# Patient Record
Sex: MALE | Race: WHITE | NOT HISPANIC OR LATINO
[De-identification: names, ages, dates, MRNs, and addresses within clinical notes are randomized per-mention and may not be internally consistent; named-entity substitution may affect disease eponyms.]

---
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
## 2024-11-07 ENCOUNTER — HOSPITAL ENCOUNTER (OUTPATIENT)
Age: 19
Discharge: HOME | End: 2024-11-07
Payer: COMMERCIAL

## 2024-11-07 ENCOUNTER — APPOINTMENT (OUTPATIENT)
Dept: GENERAL RADIOLOGY | Age: 19
End: 2024-11-07
Attending: PHYSICIAN ASSISTANT
Payer: COMMERCIAL

## 2024-11-07 VITALS
OXYGEN SATURATION: 99 % | RESPIRATION RATE: 16 BRPM | SYSTOLIC BLOOD PRESSURE: 124 MMHG | TEMPERATURE: 99 F | HEART RATE: 79 BPM | DIASTOLIC BLOOD PRESSURE: 74 MMHG

## 2024-11-07 DIAGNOSIS — M77.8 RIGHT WRIST TENDONITIS: Primary | ICD-10-CM

## 2024-11-07 PROCEDURE — L3924 HFO WITHOUT JOINTS PRE OTS: HCPCS | Performed by: PHYSICIAN ASSISTANT

## 2024-11-07 PROCEDURE — 73110 X-RAY EXAM OF WRIST: CPT | Performed by: PHYSICIAN ASSISTANT

## 2024-11-07 PROCEDURE — 99213 OFFICE O/P EST LOW 20 MIN: CPT | Performed by: PHYSICIAN ASSISTANT

## 2024-11-07 RX ORDER — IBUPROFEN 600 MG/1
600 TABLET, FILM COATED ORAL EVERY 8 HOURS PRN
Qty: 30 TABLET | Refills: 0 | Status: SHIPPED | OUTPATIENT
Start: 2024-11-07 | End: 2024-11-07

## 2024-11-07 RX ORDER — IBUPROFEN 600 MG/1
600 TABLET, FILM COATED ORAL EVERY 6 HOURS PRN
Qty: 30 TABLET | Refills: 0 | Status: SHIPPED | OUTPATIENT
Start: 2024-11-07

## 2024-11-07 NOTE — ED INITIAL ASSESSMENT (HPI)
Pt c/o right wrist pain started last night, denies any injury, sts that he pulled to tie the rope last night and heard a \"pop\" to his wrist

## 2024-11-07 NOTE — ED PROVIDER NOTES
Patient Seen in: [facility]      History     Chief Complaint   Patient presents with    Wrist Pain     Stated Complaint: hand injury    Subjective:   HPI    Patient is a 19-year-old male, right-hand-dominant, presenting to immediate care for evaluation of acute traumatic right wrist/hand injury.  Onset: Yesterday.  Occurred at work.  Patient works at a warehouse.  States was lifting a heavy box 5 days ago with he felt initial discomfort at right wrist.  States does a lot of breaking down boxes and tying them with rope. Last night felt a pop while pulled too tie a rope.  Pain radiating to hand and forearm.  Pain exacerbated with movement and with lifting.  Relief with rest.  Has not taken thing for pain.  Declining pain medication at this time.  Concern for underlying fracture. Remote history of hand fracture.  He denies any associated swelling or bruising or redness or warmth.  No rash.  No numbness weakness paresthesias.  Right-hand-dominant.  Work-related injury.  Does not want to use Workmen's Compensation at this time.  Coming to immediate care for x-ray imaging.      Objective:     No pertinent past medical history.            No pertinent past surgical history.              No pertinent social history.            Review of Systems   Constitutional:  Positive for activity change.   Musculoskeletal:  Negative for joint swelling.        Right wrist/hand pain   Skin:  Negative for rash and wound.   Neurological:  Negative for weakness and numbness.   Psychiatric/Behavioral:  Negative for confusion.    All other systems reviewed and are negative.      Positive for stated complaint: hand injury  Other systems are as noted in HPI.  Constitutional and vital signs reviewed.      All other systems reviewed and negative except as noted above.    Physical Exam     ED Triage Vitals [11/07/24 1348]   /74   Pulse 79   Resp 16   Temp 98.6 °F (37 °C)   Temp src Temporal   SpO2 99 %   O2 Device None (Room  air)       Current Vitals:   Vital Signs  BP: 124/74  Pulse: 79  Resp: 16  Temp: 98.6 °F (37 °C)  Temp src: Temporal    Oxygen Therapy  SpO2: 99 %  O2 Device: None (Room air)        Physical Exam  Vitals and nursing note reviewed.   Constitutional:       General: He is not in acute distress.     Appearance: Normal appearance. He is not ill-appearing, toxic-appearing or diaphoretic.   HENT:      Head: Normocephalic and atraumatic.      Mouth/Throat:      Mouth: Mucous membranes are moist.   Eyes:      Conjunctiva/sclera: Conjunctivae normal.   Cardiovascular:      Rate and Rhythm: Normal rate and regular rhythm.      Pulses: Normal pulses.   Pulmonary:      Effort: No respiratory distress.   Musculoskeletal:         General: Tenderness and signs of injury present. No swelling. Normal range of motion.   Skin:     Findings: No bruising, erythema or rash.   Neurological:      General: No focal deficit present.      Mental Status: He is alert and oriented to person, place, and time.      Motor: No weakness.      Coordination: Coordination normal.      Gait: Gait normal.   Psychiatric:         Mood and Affect: Mood normal.         Behavior: Behavior normal.           ED Course   Labs Reviewed - No data to display  XR WRIST COMPLETE (MIN 3 VIEWS), RIGHT (CPT=73110)   Final Result   PROCEDURE: XR WRIST COMPLETE (MIN 3 VIEWS), RIGHT (CPT=73110)       COMPARISON: None.       INDICATIONS: Right wrist pain post injury today.       TECHNIQUE: 3 views were obtained.         FINDINGS:    BONES: Normal. No significant arthropathy, fracture or acute abnormality.   SOFT TISSUES: Negative. No visible soft tissue swelling.    EFFUSION: None visible.    OTHER: Negative.                    =====   CONCLUSION: Normal examination.                 Dictated by (CST): Brandyn Duron MD on 11/07/2024 at 2:06 PM        Finalized by (CST): Brandyn Duron MD on 11/07/2024 at 2:07 PM                      [facility]     Diagnosis: Right Wrist Tendonitis,  Initial Encounter  Traumatic - lifting/pulling injury  Work-related injury  X-Ray negative for fracture dislocation or osseous abnormality  Plan - Outpatient Management   Supportive care  NSAID PRN for pain/inflammation  Velcro Wrist Splint - For comfort/immobilization  Follow-up occupational health for work-related injury  Hand orthopedic follow-up for ongoing symptoms  Discharge instructions on tendonitis          Medical Decision Making      Disposition and Plan     Clinical Impression:  1. Right wrist tendonitis         Disposition:  Discharge  11/7/2024  2:11 pm    Follow-up:  Albert Xiong MD  [address] [facility] [id number] [phone]      Hand Orthopedic Specialist    Jose Horn, JONNATHAN  [address]  [facility] [id number] [phone]      Primary Doctor, IF needed          Medications Prescribed:  Current Discharge Medication List        START taking these medications    Details   ibuprofen 600 MG Oral Tab Take 1 tablet (600 mg total) by mouth every 6 (six) hours as needed for Pain or Fever.  Qty: 30 tablet, Refills: 0                 Supplementary Documentation:

## 2024-11-11 ENCOUNTER — OFFICE VISIT (OUTPATIENT)
Dept: SURGERY | Facility: CLINIC | Age: 19
End: 2024-11-11

## 2024-11-11 DIAGNOSIS — M65.841 OTHER SYNOVITIS AND TENOSYNOVITIS, RIGHT HAND: ICD-10-CM

## 2024-11-11 DIAGNOSIS — S60.211A CONTUSION OF RIGHT WRIST, INITIAL ENCOUNTER: Primary | ICD-10-CM

## 2024-11-11 PROCEDURE — 99204 OFFICE O/P NEW MOD 45 MIN: CPT | Performed by: PLASTIC SURGERY

## 2024-11-11 RX ORDER — INDOMETHACIN 25 MG/1
25 CAPSULE ORAL
Qty: 63 CAPSULE | Refills: 0 | Status: SHIPPED | OUTPATIENT
Start: 2024-11-11 | End: 2024-12-02

## 2024-11-11 NOTE — H&P
Syed Guerra is a 19 year old male that presents with   Chief Complaint   Patient presents with    Injury     Right wrist   .    REFERRED BY:  Dev Bro    Pacemaker: No  Latex Allergy: no  Coumadin: No  Plavix: No  Other anticoagulants: No  Diet medication: No  Cardiac stents: No    HAND DOMINANCE:  Right    Profession: warehouse     RECONSTRUCTIVE HISTORY    SUN EXPOSURE   Current no   Past yes   Sunburns no   Tanning salons current no   Tanning salons past no     SKIN CANCER    Personal history of skin cancer: none      HPI:       Injury 1: Right wrist contusion  - Date: 11/06/24  - Days Since: 5      19-year-old male right-hand-dominant with right wrist pain    He was tying down an item at work, pulled on a rope, and felt a \"pop\" in his wrist    Seen in immediate care, x-rayed    Constant pain LMF metacarpal base which radiates proximally            Review of Systems:   Constitutional: No change in appetite, chill/rigors, or fatigue  GI: No jaundice  Endocrine: No generalized weakness  Neurological: No aphasia, loss of consciousness, or seizures    Musculoskeletal:      Date of injury 11/6/24     Location right      wrist      Mechanism Pt states he was tying down an item at work and pulled on a rope then felt a pop in his wrist     Treatment Seen in immediate care,  11/7/24       Pain  Yes constant pain 5/10 worse with use, LMF MC and radiates to wrist to volar forearm and experiences tingling sensation       Numbness no      PMH:     MEDICAL  Past Medical History:    ADHD        SURGICAL  History reviewed. No pertinent surgical history.     ALLERIGIES  Allergies[1]     MEDICATIONS  Current Outpatient Medications   Medication Sig Dispense Refill    ibuprofen 600 MG Oral Tab Take 1 tablet (600 mg total) by mouth every 6 (six) hours as needed for Pain or Fever. 30 tablet 0    Amphetamine-Dextroamphet ER 15 MG Oral Capsule SR 24 Hr   0    GuanFACINE HCl ER 1 MG Oral Tablet 24 Hr   5        SOCIAL  HISTORY  Social History     Socioeconomic History    Marital status: Single   Tobacco Use    Smoking status: Never    Smokeless tobacco: Never   Vaping Use    Vaping status: Former   Substance and Sexual Activity    Alcohol use: Yes    Drug use: Not Currently   Other Topics Concern    Right Handed Yes     Social Drivers of Health     Food Insecurity: No Food Insecurity (12/7/2021)    [facility]    Food Insecurity     Within the past 30 days, I worried whether my food would run out before I got money to buy more. / En los últimos 30 días, me preocupó que la comida se podía acabar antes de tener dinero para compr...: Never true / Nunca     Within the past 30 days, the food that I bought just didn't last, and I didn't have money to get more. / En los últimos 30 días, La comida que compré no rindió lo suficiente, y no tenía dinero para...: Never true / Nunca        FAMILY HISTORY  History reviewed. No pertinent family history.       PHYSICAL EXAM:     CONSTITUTIONAL: Overall appearance - Normal  HEENT: Normocephalic  EYES: Conjunctiva - Right: Normal, Left: Normal; EOMI  EARS: Inspection - Right: Normal, Left: Normal  NECK/THYROID: Inspection - Normal, Palpation - Normal, Thyroid gland - Normal, No adenopathy  RESPIRATORY: Inspection - Normal, Effort - Normal  CARDIOVASCULAR: Regular rhythm, No murmurs  ABDOMEN: Inspection - Normal, No abdominal tenderness  NEURO: Memory intact  PSYCH: Oriented to person, place, time, and situation, Appropriate mood and affect      Hand Physical Exam:     Right wrist and digits limitation of motion  Pain with wrist extension against resistance  ECRL/ECU tender with extension against resistance  ECRB mildly tender    EDC and MPs pain with extension against resistance    X-ray independently interpreted: No fracture or dislocation    ASSESSMENT/PLAN:     Synovitis right ECRL, ECU, EDC all 4 digits    We discussed what  synovitis is, including treatment options.  Synovitis is often difficult to cure.  It may require multiple treatments over a long period of time, and symptoms may not go away completely.  Even with satisfactory treatment, synovitis may recur.  Questions were answered and the patient wishes to proceed with treatment.     Non-operative therapy may help, but surgery may still be necessary.    Splint - I would recommend a splint for comfort.  This may not relieve the symptoms.  Anti-inflammatories - I would recommend the following anti-inflammatories: Indocin.   This may not relieve the symptoms.      Wrist extension splint  Icing regimen    We discussed restrictions    2 weeks    11/11/2024  Albert Fisher MD        +++++++++++++++++++++++++++++++++++++++++      MEDICAL DECISION MAKING    PROBLEMS      MODERATE    (number / complexity)          Acute complicated injury    DATA         MODERATE    (amount / complexity)          X-rays independently reviewed    MANAGEMENT RISK  MODERATE    (complications/ morbidity)       Indocin                  MDM LEVEL    MODERATE                [1] No Known Allergies

## 2024-11-25 ENCOUNTER — OFFICE VISIT (OUTPATIENT)
Dept: SURGERY | Facility: CLINIC | Age: 19
End: 2024-11-25

## 2024-11-25 DIAGNOSIS — M65.841 OTHER SYNOVITIS AND TENOSYNOVITIS, RIGHT HAND: ICD-10-CM

## 2024-11-25 DIAGNOSIS — S60.211A CONTUSION OF RIGHT WRIST, INITIAL ENCOUNTER: Primary | ICD-10-CM

## 2024-11-25 PROCEDURE — 99213 OFFICE O/P EST LOW 20 MIN: CPT | Performed by: PLASTIC SURGERY

## 2024-11-25 NOTE — PROGRESS NOTES
Follow up R wrist contusion,extensor synovitis.  Arrived wearing R wrist splint,removed.  Never picked up Indocin, thought only ibuprofen po had been ordered.  Intermittent mid-dorsal hand pain. Rates pain 3/10.  Pain is lessening  Taking Ibuprofen 600 mg po BID, helpful.  Icing, helpful  Constant volar forearm tingling.      Injury 1: Right wrist contusion, extensor synovitis  - Date: 11/06/24  - Days Since: 19      Feeling much better  No pain    Did not  the Indocin, but has been using Motrin twice daily with relief  Icing and splint    Full range of motion  No tenderness  Good extension  No pain with extension against resistance ECRL, ECRB, ECU    Strength 85 versus 85    OT for strengthening    1 week, probably return to work at that time      +++++++++++++++++++++++++++++++++++++++++      MEDICAL DECISION MAKING    PROBLEMS      MODERATE    (number / complexity)          Acute complicated injury    DATA         STRAIGHTFORWARD    (amount / complexity)              MANAGEMENT RISK  LOW    (complications/ morbidity)       Splint/OT                  MDM LEVEL    LOW

## 2024-12-05 ENCOUNTER — OFFICE VISIT (OUTPATIENT)
Dept: SURGERY | Facility: CLINIC | Age: 19
End: 2024-12-05

## 2024-12-05 DIAGNOSIS — S60.211A CONTUSION OF RIGHT WRIST, INITIAL ENCOUNTER: Primary | ICD-10-CM

## 2024-12-05 DIAGNOSIS — M65.841 OTHER SYNOVITIS AND TENOSYNOVITIS, RIGHT HAND: ICD-10-CM

## 2024-12-05 PROCEDURE — 99213 OFFICE O/P EST LOW 20 MIN: CPT | Performed by: PLASTIC SURGERY

## 2024-12-05 NOTE — PROGRESS NOTES
Injury 1: Right wrist contusion, extensor synovitis  - Date: 11/06/24  - Days Since: 29    Pt here for 1 week follow up.  LOV 11/25/24.  Denies pain.  Pt taking ibuprofen as needed, is helpful.  Pt discontinued splint.  Pt is icing, is helpful per pt.  Pt has tingling sensation, along volar R FA.    29 days post injury  No complaints.  No pain.    Full range of motion  No tenderness    Strength 95 versus 100  No pain with extension against resistance wrist and MPs    Unrestricted activity  Return to regular work      +++++++++++++++++++++++++++++++++++++++++      MEDICAL DECISION MAKING    PROBLEMS      MODERATE    (number / complexity)          Acute complicated injury    DATA         STRAIGHTFORWARD    (amount / complexity)              MANAGEMENT RISK  LOW    (complications/ morbidity)       Splint/OT                  MDM LEVEL    LOW

## 2025-01-23 ENCOUNTER — APPOINTMENT (OUTPATIENT)
Dept: GENERAL RADIOLOGY | Age: 20
End: 2025-01-23
Attending: PHYSICIAN ASSISTANT
Payer: COMMERCIAL

## 2025-01-23 ENCOUNTER — HOSPITAL ENCOUNTER (OUTPATIENT)
Age: 20
Discharge: HOME | End: 2025-01-23
Payer: COMMERCIAL

## 2025-01-23 VITALS
DIASTOLIC BLOOD PRESSURE: 77 MMHG | HEART RATE: 75 BPM | TEMPERATURE: 99 F | OXYGEN SATURATION: 97 % | RESPIRATION RATE: 20 BRPM | SYSTOLIC BLOOD PRESSURE: 133 MMHG

## 2025-01-23 DIAGNOSIS — J11.1 INFLUENZAL BRONCHITIS: Primary | ICD-10-CM

## 2025-01-23 DIAGNOSIS — B34.9 VIRAL SYNDROME: ICD-10-CM

## 2025-01-23 LAB
POCT INFLUENZA A: POSITIVE
POCT INFLUENZA B: NEGATIVE

## 2025-01-23 PROCEDURE — 71046 X-RAY EXAM CHEST 2 VIEWS: CPT | Performed by: PHYSICIAN ASSISTANT

## 2025-01-23 RX ORDER — ALBUTEROL SULFATE 90 UG/1
2 INHALANT RESPIRATORY (INHALATION) EVERY 4 HOURS PRN
Qty: 1 EACH | Refills: 0 | Status: SHIPPED | OUTPATIENT
Start: 2025-01-23 | End: 2025-02-02

## 2025-01-23 RX ORDER — ONDANSETRON 4 MG/1
4 TABLET, ORALLY DISINTEGRATING ORAL ONCE
Status: COMPLETED | OUTPATIENT
Start: 2025-01-23 | End: 2025-01-23

## 2025-01-23 RX ORDER — ONDANSETRON 4 MG/1
4 TABLET, ORALLY DISINTEGRATING ORAL EVERY 6 HOURS PRN
Qty: 10 TABLET | Refills: 0 | Status: SHIPPED | OUTPATIENT
Start: 2025-01-23 | End: 2025-01-30

## 2025-01-23 NOTE — ED INITIAL ASSESSMENT (HPI)
N/v/d, decreased appetite, cough, chest congestion ,body aches, headache, chills, sweats for 3-4 days, denies abd pain

## 2025-01-23 NOTE — ED PROVIDER NOTES
Chief Complaint   Patient presents with    Cough/URI    Nausea/Vomiting/Diarrhea       HPI:     Syed Guerra is a 19 year old male who presents for evaluation of congestion cough body aches and periodic headache and sore throat over the last 4 days, notes developed vomiting yesterday with multiple episodes of mild anorexia with diarrhea.  Denies any vomiting or diarrhea this morning.  Notes periodic upper abdominal complaint, maximum, 3 out of 10.  Denies abdominal surgical history or previous gallstones.  Denies alcohol or drug use including marijuana or cocaine.  Patient notes tightness along the chest with cough overnight.  Cardiac risk factors: None.  Denies previous asthma bronchitis or pneumonia.  Denies associated dizziness earache dysphagia neck pain chest pressure shortness of breath abdominal pain hematemesis hematochezia dysuria or rash.      PFSH    PFSH asessment screens reviewed and agree.  Nurses notes reviewed I agree with documentation.    No family history on file.  Family history reviewed with patient/caregiver and is not pertinent to presenting problem.  Social History     Socioeconomic History    Marital status: Single     Spouse name: Not on file    Number of children: Not on file    Years of education: Not on file    Highest education level: Not on file   Occupational History    Not on file   Tobacco Use    Smoking status: Never    Smokeless tobacco: Never   Vaping Use    Vaping status: Former   Substance and Sexual Activity    Alcohol use: Yes    Drug use: Not Currently    Sexual activity: Not on file   Other Topics Concern    Left Handed Not Asked    Right Handed Yes    Currently spends a great deal of time in the sun Not Asked    Past Sunlamp Treatments for Acne Not Asked    History of tanning Not Asked    Hx of Spending Great Deal of Time in Sun Not Asked    Bad sunburns in the past Not Asked    Tanning Salons in the Past Not Asked    Hx of Radiation Treatments Not Asked     Regular use of sun block Not Asked   Social History Narrative    Not on file     Social Drivers of Health     Financial Resource Strain: Not on file   Food Insecurity: No Food Insecurity (12/7/2021)    [facility]    Food Insecurity     Within the past 30 days, I worried whether my food would run out before I got money to buy more. / En los últimos 30 días, me preocupó que la comida se podía acabar antes de tener dinero para compr...: Never true / Nunca     Within the past 30 days, the food that I bought just didn't last, and I didn't have money to get more. / En los últimos 30 días, La comida que compré no rindió lo suficiente, y no tenía dinero para...: Never true / Nunca   Transportation Needs: Not on file   Physical Activity: Not on file   Stress: Not on file   Social Connections: Not on file   Housing Stability: Not on file         ROS:   Positive for stated complaint: Chest congestion.  All other systems reviewed and negative except as noted above.  Constitutional and Vital Signs Reviewed.      Physical Exam:     Findings:    /77   Pulse 75   Temp 98.7 °F (37.1 °C) (Oral)   Resp 20   SpO2 97%   GENERAL: well developed, well nourished, well hydrated, no distress  SKIN: good skin turgor, no obvious rashes  NECK: No nuchal rigidity supple, no adenopathy  CARDIO: RRR without murmur  EXTREMITIES: no cyanosis or edema. HERNADEZ without difficulty  GI: Negative [place].  Negative [place].  Negative rebound.   normal bowel sounds  HEAD: normocephalic, atraumatic  EYES: sclera non icteric bilateral, conjunctiva clear  EARS: TMs clear bilaterally. Canals clear.  NOSE: Mild rhinorrhea.  MMM.    Nasal turbinates: pink, normal mucosa  THROAT: clear, without exudates, uvula midline, and airway patent  LUNGS: No retractions.  Clear to auscultation bilaterally; no rales, rhonchi, or wheezes  NEURO: No focal deficits  PSYCH: Alert and oriented x3.  Answering  questions appropriately.  Mood appropriate.    MDM/Assessment/Plan:   Orders for this encounter:    Orders Placed This Encounter    XR CHEST PA + LAT CHEST (HDM=[id number])     Order Specific Question:   What is the Relevant Clinical Indication / Reason for Exam?     Answer:   CHEST CONGESTION     Order Specific Question:   Release to patient     Answer:   Immediate    POCT Flu Test     Order Specific Question:   Release to patient     Answer:   Immediate    ondansetron (Zofran-ODT) disintegrating tab 4 mg    albuterol 108 (90 Base) MCG/ACT Inhalation Aero Soln     Sig: Inhale 2 puffs into the lungs every 4 (four) hours as needed for Wheezing.     Dispense:  1 each     Refill:  0    ondansetron 4 MG Oral Tablet Dispersible     Sig: Take 1 tablet (4 mg total) by mouth every 6 (six) hours as needed for Nausea.     Dispense:  10 tablet     Refill:  0       Labs performed this visit:  Recent Results (from the past 10 hours)   POCT Flu Test    Collection Time: 01/23/25  4:35 PM    Specimen: Nares; Other   Result Value Ref Range    POCT INFLUENZA A Positive (A) Negative    POCT INFLUENZA B Negative Negative       MDM:  Influenza positive, patient agrees no Tamiflu based on duration of symptoms and history of vomiting.  Patient chest x-ray without acute process.  Discussed further workup including cardiac profile with patient declining based on discussion and history of readdress outpatient versus emergently for further concern.  Patient given bronchodilator for support of suggestive bronchitis and a work note.  Patient nausea improved with antiemetic tolerated p.o. well without incident.  No abdominal tenderness on reassessment, happy with plan of care.    Diagnosis:    ICD-10-CM    1. Influenzal bronchitis  J11.1       2. Viral syndrome  B34.9           All results reviewed and discussed with patient.  See AVS for detailed discharge instructions for your condition today.    Follow Up with:  Jose Hron APRN [address]  [address] [address]  [phone]    Schedule an appointment as soon as possible for a visit in 3 days  As needed, If symptoms worsen PRIMARY CARE REFERRAL

## (undated) NOTE — LETTER
Date & Time: 1/23/2025, 5:04 PM  Patient: Syed Guerra  Encounter Provider(s):    Pieter Shearer PA       To Whom It May Concern:    Syed Guerra was seen and treated in our department on 1/23/2025. He should not return to work until MONDAY  .    If you have any questions or concerns, please do not hesitate to call.      PIETER SHEARER   _____________________________  Physician/APC Signature

## (undated) NOTE — LETTER
Date & Time: 11/7/2024, 2:11 PM  Patient: Syed Guerra  Encounter Provider(s):    Dev Bro PA Ehrhardt, Emma, RN       To Whom It May Concern:    Syed Guerra was seen and treated in our department on 11/7/2024. Please excuse from work until Monday, November 11, 2024.  No restrictions.    If you have any questions or concerns, please do not hesitate to call.        _____________________________  Physician/APC Signature

## (undated) NOTE — LETTER
Date & Time: 11/9/2023, 6:52 PM  Patient: Armando Mccullough  Encounter Provider(s):    Raquel Lemon       To Whom It May Concern:    Ratna Navarrete was seen and treated in our department on 11/9/2023. He should not return to work until [employer]  . If you have any questions or concerns, please do not hesitate to call.       KIKO GRACIA   _____________________________  APRECQAOW/NQK Signature